# Patient Record
Sex: FEMALE
[De-identification: names, ages, dates, MRNs, and addresses within clinical notes are randomized per-mention and may not be internally consistent; named-entity substitution may affect disease eponyms.]

---

## 2019-05-23 PROBLEM — Z00.00 ENCOUNTER FOR PREVENTIVE HEALTH EXAMINATION: Status: ACTIVE | Noted: 2019-05-23

## 2021-11-05 ENCOUNTER — APPOINTMENT (OUTPATIENT)
Dept: HEMATOLOGY ONCOLOGY | Facility: CLINIC | Age: 51
End: 2021-11-05

## 2021-12-02 NOTE — DISCUSSION/SUMMARY
[FreeTextEntry1] : REASON FOR CONSULT\par Kathryn Zuleta is a 51-year-old female who was contacted for a discussion regarding her genetic testing results related to hereditary cancer predisposition. \par \par Ms. Zuleta was originally seen with Cancer Genetics on 11/05/2021 for hereditary cancer predisposition risk assessment. Ms. Zuleta has no personal history of cancer and family history of colon, breast, and sarcoma cancer. Ms. Zuleta decided to pursue genetic testing using the genes associated with hereditary breast, colon, and sarcoma cancers.\par \par TEST RESULTS: NEGATIVE\par NO pathogenic (disease-causing) variants or variants of uncertain significance were detected in the following genes from genes associated with hereditary Colorectal Cancer Panel: APC, AXIN2, BMPR1A, CHEK2, EPCAM, GREM1, MLH1, MSH2, MSH3, MSH6, MUTYH, NTHL1, PMS2, POLD1, POLE, PTEN, SMAD4, TP53; Sarcoma Panel: APC, BLM, CDKN1C, DICER1, EPCAM, EXT1, EXT2, FH, HRAS, KIT, MLH1, MSH2, MSH6, NBN, NF1, PDGFRA, PMS2, HEGSG8G, PTCH1, RB1, RECQL4, SDHA, SDHB, SDHC, SDHD, SUFU, TP53, WRN; Breast Guidelines Panel: KITTY, BARD1, BRIP1, BRCA1, BRCA2, CDH1, CHEK2, NBN, NF1, PALB2, PTEN, RAD51C, RAD51D, STK11, TP53, RAD51C, RAD51D, and TP53.\par \par TEST RESULT: UNCERTAIN SIGNIFICANCE\par 1 variant of uncertain significance was detected in the following gene: STK11\par \par RESULTS INTERPRETATION AND ASSESSMENT:\par \par NO known disease-causing mutations were identified in any of the genes tested. 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 At this time the available evidence is insufficient to determine the role of this variant in disease and the clinical significance of this result is uncertain. Individuals with a single pathogenic (disease-causing) mutation in the STK11 gene have a condition called Peutz-Jeghers syndrome. Peutz-Jeghers syndrome is an autosomal-dominant cancer predisposition syndrome with increased risks of primarily developing hamartomatous polyps in the digestive tract, as well as cancers of the breast, colon and rectum, pancreas, stomach, testicles, ovaries, lung, cervix, and others. It is unknown if the patient has an increased risk for the cancers associated with MSH2 gene at this time. \par \par Given Ms. Zuleta’s family history of cancer, and her negative genetic test results, the following screening guidelines and risk-reducing recommendations were discussed:\par •	OTHER: In the absence of other indications, Ms. Zuleta should practice age-appropriate cancer screening as recommended for the general population.\par \par We also discussed the limitations of negative results:\par 1.	The cause of Ms. Zuleta’s family history of cancer remains unknown. It may have developed randomly, or due to environmental factors.  \par 2.	This negative result does not completely rule out a hereditary basis for the reported personal history due to limitations in technology or a variant being present in an unidentified gene. \par 3.	Variants in other genes would not be identified by this analysis, so this negative result does not rule out the low likelihood of having a mutation in a different hereditary cancer gene or the possibility of ever developing cancer.\par 4.	It is possible there is a hereditary cancer predisposition gene mutation in the family, but the patient did not inherit it.  \par \par We informed Ms. Zuleta that our knowledge of genetics and inherited cancer conditions is changing rapidly. Therefore, we recommended that Ms. Zuleta contact our office, every 2 to 3 years, to discuss relevant advances in cancer genetics.  We emphasized the importance of re-contacting us with updates regarding her personal and family history of cancer as well as any updates regarding additional cancer genetic test results performed for the patient and/or family members.  Such updates could possibly change our risk assessment and recommendations. \par 	\par PLAN:\par 1.	These results do not change Ms. Zuleta’s medical management. Long-term management and surveillance should be based on the patient’s on- or post-treatment protocol as recommended by her oncologist (and general population guidelines for other cancers).\par 2.	Testing of family members based on this result is not indicated. \par 3.	Ms. Zuleta was encouraged to contact us every 2-3 years to discuss relevant advances in cancer genetics, or sooner if there are any changes in her personal or family history of cancer.\par 	\par For any additional questions please call Cancer Genetics at [phone]. \par \par Kevin Doss, MS, [facility]\par Genetic Counselor, Cancer Genetics\par \par CC: \par Patient 	\par Dr. Rebel Hargrove\par \par \par \par

## 2024-04-18 ENCOUNTER — NON-APPOINTMENT (OUTPATIENT)
Age: 54
End: 2024-04-18

## 2024-04-18 NOTE — DISCUSSION/SUMMARY
[FreeTextEntry1] : The STK11 variant of uncertain significance that was previously detected in Ms. Zuleta's hereditary cancer genetic testing done in 2021 was recently reclassified as "likely benign" by [facility] on 4/10/2024. This result does not change Ms. Zuleta's medical management. Ms. Zuleta should undergo colonoscopy screening at least every 5 years or at the discretion of her gastroenterologist given her sister's colon cancer diagnosis. A copy of the updated report was sent for scanning. We are available if Ms. Zuleta has any questions or concerns at any point in the future.  Ramona Leong MS, [facility] [place]  Genetic Counselor, Cancer Genetics

## 2025-02-26 ENCOUNTER — EMERGENCY (EMERGENCY)
Facility: HOSPITAL | Age: 55
LOS: 1 days | Discharge: HOME | End: 2025-02-26
Attending: EMERGENCY MEDICINE
Payer: COMMERCIAL

## 2025-02-26 VITALS
WEIGHT: 175.05 LBS | HEART RATE: 64 BPM | TEMPERATURE: 98 F | SYSTOLIC BLOOD PRESSURE: 164 MMHG | DIASTOLIC BLOOD PRESSURE: 76 MMHG | RESPIRATION RATE: 18 BRPM | OXYGEN SATURATION: 99 %

## 2025-02-26 PROCEDURE — 99283 EMERGENCY DEPT VISIT LOW MDM: CPT | Mod: 25

## 2025-02-26 PROCEDURE — 99284 EMERGENCY DEPT VISIT MOD MDM: CPT | Mod: 25

## 2025-02-26 PROCEDURE — 26720 TREAT FINGER FRACTURE EACH: CPT | Mod: F5

## 2025-02-26 PROCEDURE — 73130 X-RAY EXAM OF HAND: CPT | Mod: 26,RT

## 2025-02-26 PROCEDURE — 29125 APPL SHORT ARM SPLINT STATIC: CPT | Mod: RT

## 2025-02-26 PROCEDURE — 73130 X-RAY EXAM OF HAND: CPT

## 2025-02-26 RX ORDER — OXYCODONE HYDROCHLORIDE AND ACETAMINOPHEN 5; 325 MG/1; MG/1
1 TABLET ORAL
Qty: 9 | Refills: 0
Start: 2025-02-26 | End: 2025-02-28

## 2025-02-26 RX ORDER — IBUPROFEN 600 MG/1
1 TABLET, FILM COATED ORAL
Qty: 20 | Refills: 0
Start: 2025-02-26 | End: 2025-03-02

## 2025-02-26 RX ORDER — IBUPROFEN 600 MG/1
600 TABLET, FILM COATED ORAL ONCE
Refills: 0 | Status: COMPLETED | OUTPATIENT
Start: 2025-02-26 | End: 2025-02-26

## 2025-02-26 RX ADMIN — IBUPROFEN 600 MILLIGRAM(S): 600 TABLET, FILM COATED ORAL at 12:14

## 2025-02-26 NOTE — ED PROVIDER NOTE - OBJECTIVE STATEMENT
Patient is a 54-year-old lady with past medical history of hypertension, hyperlipidemia, diabetes who presents to the ED because of thumb pain.  Patient had a mechanical fall forward on an uneven sidewalk today.  No head strike, no LOC.  Fell on her right hand.  Patient is right-handed.  No history of hand surgeries.  No numbness or tingling, no pain when she moves her hand.

## 2025-02-26 NOTE — ED PROVIDER NOTE - PROGRESS NOTE DETAILS
Pt has 1st phalanx fracture, splinted w improvement of deformity, and referred to hand surgery for f/u.

## 2025-02-26 NOTE — ED PROVIDER NOTE - PATIENT PORTAL LINK FT
You can access the FollowMyHealth Patient Portal offered by [facility] by registering at the following website: [email or web address]. By joining [facility]’s FollowMyHealth portal, you will also be able to view your health information using other applications (apps) compatible with our system.

## 2025-02-26 NOTE — ED PROVIDER NOTE - CARE PROVIDER_API CALL
Mitul Hutchinson.  Surgery  [address], [address]  [place] [address]  Phone: [phone]  Fax: [phone]  Follow Up Time: 1-3 Days

## 2025-02-27 ENCOUNTER — APPOINTMENT (OUTPATIENT)
Dept: PLASTIC SURGERY | Facility: CLINIC | Age: 55
End: 2025-02-27

## 2025-02-28 ENCOUNTER — APPOINTMENT (OUTPATIENT)
Facility: CLINIC | Age: 55
End: 2025-02-28

## 2025-02-28 VITALS
BODY MASS INDEX: 27.78 KG/M2 | WEIGHT: 177 LBS | SYSTOLIC BLOOD PRESSURE: 136 MMHG | DIASTOLIC BLOOD PRESSURE: 85 MMHG | TEMPERATURE: 98.2 F | HEART RATE: 73 BPM | OXYGEN SATURATION: 97 % | HEIGHT: 67 IN

## 2025-02-28 DIAGNOSIS — S69.91XA UNSPECIFIED INJURY OF RIGHT WRIST, HAND AND FINGER(S), INITIAL ENCOUNTER: ICD-10-CM

## 2025-02-28 PROCEDURE — 99204 OFFICE O/P NEW MOD 45 MIN: CPT | Mod: 25

## 2025-02-28 PROCEDURE — 29125 APPL SHORT ARM SPLINT STATIC: CPT | Mod: RT

## 2025-02-28 RX ORDER — METFORMIN HYDROCHLORIDE 750 MG/1
TABLET ORAL
Refills: 0 | Status: ACTIVE | COMMUNITY

## 2025-02-28 RX ORDER — ATORVASTATIN CALCIUM 20 MG/1
20 TABLET, FILM COATED ORAL
Refills: 0 | Status: ACTIVE | COMMUNITY

## 2025-02-28 RX ORDER — OXYBUTYNIN CHLORIDE 5 MG/1
5 TABLET ORAL
Refills: 0 | Status: ACTIVE | COMMUNITY

## 2025-02-28 RX ORDER — LOSARTAN POTASSIUM 50 MG/1
50 TABLET, FILM COATED ORAL
Refills: 0 | Status: ACTIVE | COMMUNITY

## 2025-02-28 RX ORDER — IBUPROFEN 800 MG/1
TABLET, FILM COATED ORAL
Refills: 0 | Status: ACTIVE | COMMUNITY

## 2025-03-05 ENCOUNTER — APPOINTMENT (OUTPATIENT)
Dept: MRI IMAGING | Facility: CLINIC | Age: 55
End: 2025-03-05

## 2025-03-07 ENCOUNTER — APPOINTMENT (OUTPATIENT)
Facility: CLINIC | Age: 55
End: 2025-03-07

## 2025-03-11 ENCOUNTER — APPOINTMENT (OUTPATIENT)
Dept: MRI IMAGING | Facility: CLINIC | Age: 55
End: 2025-03-11